# Patient Record
Sex: MALE | URBAN - METROPOLITAN AREA
[De-identification: names, ages, dates, MRNs, and addresses within clinical notes are randomized per-mention and may not be internally consistent; named-entity substitution may affect disease eponyms.]

---

## 2022-04-05 ENCOUNTER — OFFICE VISIT (OUTPATIENT)
Dept: URBAN - METROPOLITAN AREA CLINIC 91 | Facility: CLINIC | Age: 22
End: 2022-04-05
Payer: COMMERCIAL

## 2022-04-05 DIAGNOSIS — H11.433 CONJUNCTIVAL HYPEREMIA, BILATERAL: Primary | ICD-10-CM

## 2022-04-05 PROCEDURE — 99202 OFFICE O/P NEW SF 15 MIN: CPT | Performed by: SPECIALIST

## 2022-04-05 ASSESSMENT — INTRAOCULAR PRESSURE
OD: 15
OS: 15

## 2022-04-05 ASSESSMENT — VISUAL ACUITY
OD: 20/20
OS: 20/20

## 2022-04-05 NOTE — IMPRESSION/PLAN
Impression: Conjunctival hyperemia, bilateral: H11.433.  Plan: Discussed hyperemia OU, no ocular infections found at this time
no need for glasses now, pt is 20/25 uncorrected